# Patient Record
Sex: FEMALE | Race: WHITE | Employment: UNEMPLOYED | URBAN - METROPOLITAN AREA
[De-identification: names, ages, dates, MRNs, and addresses within clinical notes are randomized per-mention and may not be internally consistent; named-entity substitution may affect disease eponyms.]

---

## 2017-01-05 ENCOUNTER — OFFICE VISIT (OUTPATIENT)
Dept: FAMILY MEDICINE CLINIC | Facility: CLINIC | Age: 37
End: 2017-01-05

## 2017-01-05 VITALS — OXYGEN SATURATION: 98 % | HEART RATE: 78 BPM | WEIGHT: 140 LBS | TEMPERATURE: 98 F

## 2017-01-05 DIAGNOSIS — J35.8 TONSILLAR CYST: Primary | ICD-10-CM

## 2017-01-06 NOTE — PROGRESS NOTES
CHIEF COMPLAINT:   Patient presents with:  Throat Problem      HPI:   Evelia Barth is a 39year old female who presents for 3 weeks ago with cough, fatigue, cough kept awake a night, no fever or runny nose. ST first day that has subsided.  OTC: cough syrup appt. To ER for any airway compromise, difficulty swallowing or excessive drooling. Patient verbalized understanding.

## 2017-01-16 ENCOUNTER — OFFICE VISIT (OUTPATIENT)
Dept: OTOLARYNGOLOGY | Facility: CLINIC | Age: 37
End: 2017-01-16

## 2017-01-16 VITALS
TEMPERATURE: 99 F | WEIGHT: 140 LBS | HEIGHT: 65 IN | DIASTOLIC BLOOD PRESSURE: 71 MMHG | SYSTOLIC BLOOD PRESSURE: 110 MMHG | BODY MASS INDEX: 23.32 KG/M2

## 2017-01-16 DIAGNOSIS — J03.90 ACUTE TONSILLITIS, UNSPECIFIED ETIOLOGY: ICD-10-CM

## 2017-01-16 DIAGNOSIS — J35.8 RETENTION CYST OF TONSIL: Primary | ICD-10-CM

## 2017-01-16 PROCEDURE — 99243 OFF/OP CNSLTJ NEW/EST LOW 30: CPT | Performed by: OTOLARYNGOLOGY

## 2017-01-16 PROCEDURE — 99212 OFFICE O/P EST SF 10 MIN: CPT | Performed by: OTOLARYNGOLOGY

## 2017-01-16 RX ORDER — CIPROFLOXACIN 500 MG/1
500 TABLET, FILM COATED ORAL 2 TIMES DAILY
COMMUNITY

## 2017-01-17 NOTE — PROGRESS NOTES
Adelfo Kebede is a 39year old female. Patient presents with: Tonsil Problem: white bumps on tonsil for 1.5 month, pt is currently on antibiotics       HISTORY OF PRESENT ILLNESS  Previous tonsil infection 1.5 months ago.  Resolved on antibiotics and subs Constitutional Normal Overall appearance - Normal.   Psychiatric Normal Orientation - Oriented to time, place, person & situation. Appropriate mood and affect.    Neck Exam Normal Inspection - Normal. Palpation - Normal. Parotid gland - Normal. Thyroid gl

## 2017-01-19 ENCOUNTER — TELEPHONE (OUTPATIENT)
Dept: OTOLARYNGOLOGY | Facility: CLINIC | Age: 37
End: 2017-01-19

## 2017-01-20 RX ORDER — AMOXICILLIN AND CLAVULANATE POTASSIUM 875; 125 MG/1; MG/1
1 TABLET, FILM COATED ORAL EVERY 12 HOURS
Qty: 20 TABLET | Refills: 0 | Status: SHIPPED | OUTPATIENT
Start: 2017-01-20 | End: 2017-01-30

## (undated) NOTE — Clinical Note
[place] [id number], [address] [facility]       01/16/2017        Patient: Ailyn Goodman   YOB: 1980   Date of Visit: 1/16/2017       Dear  Dr. Patrica Dueñas MD,      Thank you for referring Ailyn Goodman to my practice.   P

## (undated) NOTE — MR AVS SNAPSHOT
[facility]  [address], [address] [facility] [address]  [phone]               Thank you for choosing us for your health care visit with Byron Gomez.  Alison Messer MD.  We are glad to serve you and happy to provide you with this summary Expires: 3/6/2017  6:53 PM    If you have questions, you can call [phone] to talk to our [facility] Staff. Remember, [employer] is NOT to be used for urgent needs. For medical emergencies, dial 911.            Visit [employer] online a

## (undated) NOTE — MR AVS SNAPSHOT
[facility] [address]  [phone]               Thank you for choosing us for your health care visit with PERLA Mercado.   We are glad to serve you and happy to provide you with this summary of your vi Support Staff. Remember, MyChart is NOT to be used for urgent needs. For medical emergencies, dial 911.         Educational Information     Healthy Diet and Regular Exercise  [employer] for making healthy food choices  -   Enjoy